# Patient Record
Sex: FEMALE | Race: WHITE
[De-identification: names, ages, dates, MRNs, and addresses within clinical notes are randomized per-mention and may not be internally consistent; named-entity substitution may affect disease eponyms.]

---

## 2018-01-09 ENCOUNTER — HOSPITAL ENCOUNTER (OUTPATIENT)
Dept: HOSPITAL 63 - RAD | Age: 38
Discharge: HOME | End: 2018-01-09
Attending: GENERAL PRACTICE
Payer: COMMERCIAL
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
## 2018-01-09 DIAGNOSIS — S51.012A: Primary | ICD-10-CM
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
## 2018-01-09 PROCEDURE — 73080 X-RAY EXAM OF ELBOW: CPT

## 2018-01-10 NOTE — RAD
Indication: Elbow laceration, fall one day ago. Pain.



Technique: Left elbow series contains 5 images. No comparison is available.



Findings: There is no fracture or dislocation. There is no displacement of fat

pads/joint effusion. There is no soft tissue swelling. There is no radiopaque

foreign body.



Impression: Negative for fracture.